# Patient Record
(demographics unavailable — no encounter records)

---

## 2025-01-24 NOTE — ASSESSMENT
[FreeTextEntry1] : Plan: 67 yo F presenting today for evaluation of diarrhea and dysphagia. Would recommend colonoscopy to investigate diarrhea, offered stool testing as well however pt declined. Given dysphagia, would also recommend EGD. Pt again declines, would like to see ENT first. Risks versus benefits as well as instructions reviewed, pt agrees to planned colonoscopy. All questions answered.

## 2025-01-24 NOTE — REVIEW OF SYSTEMS
[Abdominal Pain] : no abdominal pain [Vomiting] : no vomiting [Constipation] : no constipation [Diarrhea] : diarrhea [Heartburn] : no heartburn [Melena (black stool)] : no melena [Bleeding] : no bleeding [Fecal Incontinence (soiling)] : no fecal incontinence [Bloating (gassiness)] : no bloating [Negative] : Heme/Lymph

## 2025-01-24 NOTE — PHYSICAL EXAM
[Alert] : alert [Healthy Appearing] : healthy appearing [Sclera] : the sclera and conjunctiva were normal [Hearing Threshold Finger Rub Not [place]] : hearing was normal [Normal Appearance] : the appearance of the neck was normal [No Respiratory Distress] : no respiratory distress [Auscultation Breath Sounds / Voice Sounds] : lungs were clear to auscultation bilaterally [Heart Rate And Rhythm] : heart rate was normal and rhythm regular [Bowel Sounds] : normal bowel sounds [Abdomen Tenderness] : non-tender [Abdomen Soft] : soft [Abnormal Walk] : normal gait [Normal Color / Pigmentation] : normal skin color and pigmentation [Oriented To Time, Place, And Person] : oriented to person, place, and time

## 2025-01-24 NOTE — HISTORY OF PRESENT ILLNESS
[FreeTextEntry1] : Cynthia Alcala is a 69 yo F presenting today for evaluation of diarrhea. Pt reports she typically has about 4 bowel movements in the morning that are all loose. Denies associated hematochezia or melena, denies unintentional weight loss related. Reports this has been occurring for about 6 months. Also reports some recent dysphagia particularly withdry items such as bread. Is scheduled to see ENT in coming weeks. Rachid nausea, vomiting, unintentional weight loss.

## 2025-05-28 NOTE — ASSESSMENT
[FreeTextEntry1] : Area of palpable concern medial right breast likely secondary to contusion  No suspicious findings  No cellulitis, no abscess  Patient reassured  Follow-up 3 months  A total of 30 minutes was spent in consultation Yes

## 2025-05-28 NOTE — PHYSICAL EXAM
[Normocephalic] : normocephalic [Atraumatic] : atraumatic [Supple] : supple [No Supraclavicular Adenopathy] : no supraclavicular adenopathy [Examined in the supine and seated position] : examined in the supine and seated position [No Nipple Retraction] : no right nipple retraction [No Nipple Discharge] : no right nipple discharge [No Axillary Lymphadenopathy] : no left axillary lymphadenopathy [No Edema] : no edema [No Rashes] : no rashes [No Ulceration] : no ulceration [de-identified] : Soft cystic mass medial right breast

## 2025-05-28 NOTE — PHYSICAL EXAM
[Normocephalic] : normocephalic [Atraumatic] : atraumatic [Supple] : supple [No Supraclavicular Adenopathy] : no supraclavicular adenopathy [Examined in the supine and seated position] : examined in the supine and seated position [No Nipple Retraction] : no right nipple retraction [No Nipple Discharge] : no right nipple discharge [No Axillary Lymphadenopathy] : no left axillary lymphadenopathy [No Edema] : no edema [No Rashes] : no rashes [No Ulceration] : no ulceration [de-identified] : Soft cystic mass medial right breast

## 2025-05-28 NOTE — PROCEDURE
[FreeTextEntry3] : Ultrasound right breast  Patient reports a palpable mass in the medial right breast  Targeted exam demonstrates hematoma  There is some probable fat necrosis  No suspicious solid mass  BI-RADS 3

## 2025-05-28 NOTE — ASSESSMENT
[FreeTextEntry1] : Area of palpable concern medial right breast likely secondary to contusion  No suspicious findings  No cellulitis, no abscess  Patient reassured  Follow-up 3 months  A total of 30 minutes was spent in consultation

## 2025-05-28 NOTE — HISTORY OF PRESENT ILLNESS
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

## 2025-05-28 NOTE — HISTORY OF PRESENT ILLNESS
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